# Patient Record
Sex: MALE | Race: WHITE | NOT HISPANIC OR LATINO | URBAN - NONMETROPOLITAN AREA
[De-identification: names, ages, dates, MRNs, and addresses within clinical notes are randomized per-mention and may not be internally consistent; named-entity substitution may affect disease eponyms.]

---

## 2017-07-06 ENCOUNTER — TELEPHONE (OUTPATIENT)
Dept: CARDIOLOGY | Facility: CLINIC | Age: 37
End: 2017-07-06

## 2017-07-06 RX ORDER — PRAVASTATIN SODIUM 20 MG
20 TABLET ORAL DAILY
Qty: 30 TABLET | Refills: 4 | Status: SHIPPED | OUTPATIENT
Start: 2017-07-06 | End: 2017-11-15 | Stop reason: SDUPTHER

## 2017-07-06 NOTE — TELEPHONE ENCOUNTER
Patient called requesting refill on Pravastatin 20mg daily. Refill on Pravastatin 20mg daily sent to pharmacy.

## 2017-11-15 ENCOUNTER — OFFICE VISIT (OUTPATIENT)
Dept: CARDIOLOGY | Facility: CLINIC | Age: 37
End: 2017-11-15

## 2017-11-15 VITALS
HEART RATE: 68 BPM | WEIGHT: 281 LBS | HEIGHT: 76 IN | SYSTOLIC BLOOD PRESSURE: 138 MMHG | DIASTOLIC BLOOD PRESSURE: 82 MMHG | BODY MASS INDEX: 34.22 KG/M2

## 2017-11-15 DIAGNOSIS — I10 ESSENTIAL HYPERTENSION: ICD-10-CM

## 2017-11-15 DIAGNOSIS — R00.2 PALPITATION: Primary | ICD-10-CM

## 2017-11-15 DIAGNOSIS — F41.9 ANXIETY: ICD-10-CM

## 2017-11-15 PROCEDURE — 99213 OFFICE O/P EST LOW 20 MIN: CPT | Performed by: NURSE PRACTITIONER

## 2017-11-15 RX ORDER — PRAVASTATIN SODIUM 20 MG
20 TABLET ORAL DAILY
Qty: 90 TABLET | Refills: 3 | Status: SHIPPED | OUTPATIENT
Start: 2017-11-15 | End: 2018-11-21 | Stop reason: SDUPTHER

## 2017-11-15 NOTE — PROGRESS NOTES
Chief Complaint   Patient presents with   • Follow-up     For cardiac management. Last labs at [facility] 09/2017.   • Shortness of Breath     He states only if walking up stairs.   • Palpitations     Occasional, states about the same.   • Med Refill     Needs refills on Pravastatin-90 day.       Cardiac Complaints  dyspnea and palpitations      Subjective   Bishnu Renee is a 37 y.o. male with a history of tachycardia, hyperlipidemia, and chest pain.  Cardiac workup has been negative for ischemia.  Ecardio showed no arrhythmias.  Corgard was started for palpitations.  He returns today for follow up and states issues with rare palpitations but better than prior. He also continues to shortness of breath with climbing but relates to inactivity, says this has been the same for years.  Labs he reports were done in September at [facility], no copy available for review.  Refills of pravastatin requested.    Cardiac History  Past Surgical History:   Procedure Laterality Date   • CARDIOVASCULAR STRESS TEST  06/17/2013    Stress- ([facility]) 8 Min, 85% THR. /90. Negative.    • CONVERTED (HISTORICAL) HOLTER  01/09/2014    Holter ([facility])- AVG HR 70 BPM.    • ECHO - CONVERTED  01/13/2014    Echo ([facility])- EF 65%    • OTHER SURGICAL HISTORY  01/13/2014    CTA renals ([facility])- Normal    • OTHER SURGICAL HISTORY  03/20/2014    Ecardio- Sinus        Current Outpatient Prescriptions   Medication Sig Dispense Refill   • ALPRAZolam (XANAX) 1 MG tablet Take 1 mg by mouth As Needed for anxiety.     • aspirin 81 MG EC tablet Take 81 mg by mouth Daily.     • nadolol (CORGARD) 40 MG tablet Take 40 mg by mouth 2 (Two) Times a Day.     • PARoxetine (PAXIL) 20 MG tablet Take 20 mg by mouth Every Morning.     • pravastatin (PRAVACHOL) 20 MG tablet Take 1 tablet by mouth Daily. 90 tablet 3   • raNITIdine (ZANTAC) 300 MG tablet Take 300 mg by mouth Every Night.       No current facility-administered medications for this visit.        Review of  "patient's allergies indicates no known allergies.    Past Medical History:   Diagnosis Date   • Anxiety    • H/O hernia repair    • Hypertension    • Palpitations    • SOB (shortness of breath)        Social History     Social History   • Marital status: Single     Spouse name: N/A   • Number of children: N/A   • Years of education: N/A     Occupational History   • Not on file.     Social History Main Topics   • Smoking status: Former Smoker     Quit date: 2012   • Smokeless tobacco: Never Used   • Alcohol use Yes      Comment: rarely   • Drug use: No   • Sexual activity: Not on file     Other Topics Concern   • Not on file     Social History Narrative       Family History   Problem Relation Age of Onset   • Hyperlipidemia Mother    • Asthma Brother        Review of Systems   Constitution: Negative for weakness and malaise/fatigue.   Cardiovascular: Positive for palpitations. Negative for chest pain, dyspnea on exertion and near-syncope.   Respiratory: Positive for shortness of breath. Negative for wheezing.    Musculoskeletal: Negative for arthritis and back pain.   Gastrointestinal: Negative for anorexia, heartburn and nausea.   Genitourinary: Negative for dysuria, hematuria and nocturia.   Neurological: Negative for dizziness, light-headedness and loss of balance.   Psychiatric/Behavioral: Negative for altered mental status and depression.       DiabetesNo  Thyroidnormal    Objective     /82 (BP Location: Left arm)  Pulse 68  Ht 76\" (193 cm)  Wt 281 lb (127 kg)  BMI 34.2 kg/m2    Physical Exam   Constitutional: He is oriented to person, place, and time. He appears well-developed and well-nourished.   HENT:   Head: Normocephalic and atraumatic.   Eyes: EOM are normal. Pupils are equal, round, and reactive to light.   Neck: Normal range of motion. Neck supple.   Cardiovascular: Normal rate and regular rhythm.    Pulmonary/Chest: Effort normal and breath sounds normal.   Abdominal: Soft.   Musculoskeletal: " Normal range of motion.   Neurological: He is alert and oriented to person, place, and time.   Skin: Skin is warm and dry.   Psychiatric: He has a normal mood and affect. His behavior is normal.       Procedures    Assessment/Plan     HR and BP are stable today.  No changes in meds will be made. No new cardiac workup will be advised today as no new concerns are voiced.  Labs are done with health [facility] and patient reports most recent looked okay.  He states his cholesterol was well controlled with the addition of pravastatin.  He was encouraged to get a copy to chart for review. Refills of pravastatin sent per request.  Yearly follow ups will be continued unless problems arise and patient was advised to call for further recommendations.      Problems Addressed this Visit        Cardiovascular and Mediastinum    Palpitation - Primary    HTN (hypertension)       Other    Anxiety                  Electronically signed by CHAD Alvares November 15, 2017 5:14 PM

## 2018-11-21 ENCOUNTER — OFFICE VISIT (OUTPATIENT)
Dept: CARDIOLOGY | Facility: CLINIC | Age: 38
End: 2018-11-21

## 2018-11-21 VITALS
SYSTOLIC BLOOD PRESSURE: 112 MMHG | DIASTOLIC BLOOD PRESSURE: 70 MMHG | WEIGHT: 295 LBS | HEIGHT: 76 IN | HEART RATE: 68 BPM | BODY MASS INDEX: 35.92 KG/M2

## 2018-11-21 DIAGNOSIS — E78.2 MIXED HYPERLIPIDEMIA: ICD-10-CM

## 2018-11-21 DIAGNOSIS — F41.9 ANXIETY: ICD-10-CM

## 2018-11-21 DIAGNOSIS — I10 ESSENTIAL HYPERTENSION: Primary | ICD-10-CM

## 2018-11-21 DIAGNOSIS — R00.2 PALPITATION: ICD-10-CM

## 2018-11-21 PROCEDURE — 99213 OFFICE O/P EST LOW 20 MIN: CPT | Performed by: NURSE PRACTITIONER

## 2018-11-21 RX ORDER — PRAVASTATIN SODIUM 20 MG
20 TABLET ORAL DAILY
Qty: 90 TABLET | Refills: 3 | Status: SHIPPED | OUTPATIENT
Start: 2018-11-21 | End: 2019-11-20 | Stop reason: SDUPTHER

## 2018-11-21 NOTE — PROGRESS NOTES
"Chief Complaint   Patient presents with   • Follow-up     Cardiac management. No recent labs, he has lab order yet has not been able to have done.   • Chest Pain     He states \"will rarely have dull ache to left chest\".   • Med Refill     Needs refills on Pravastatin-90 day.       Subjective       Bishnu Renee is a 38 y.o. male with a history of tachycardia, hyperlipidemia, and chest pain.  Cardiac workup has been negative for ischemia. Ecardio showed no arrhythmias. Corgard was started for palpitations. He has done very well. Today he came for follow up. He feels well. He has an occasional dull ache in his left chest which only lasts a couple of seconds. Denies shortness of breath. No palpitations since being on beta blockers. He has been treated with pravastatin for hyperlipidemia. No recent labs. He has an order from Dr. Lester, but has not completed yet. Refills requested on pravastatin. He is excited about his new baby that was born three months ago.     HPI         Cardiac History:    Past Surgical History:   Procedure Laterality Date   • CARDIOVASCULAR STRESS TEST  06/17/2013    Stress- ([facility]) 8 Min, 85% THR. /90. Negative.    • CONVERTED (HISTORICAL) HOLTER  01/09/2014    Holter ([facility])- AVG HR 70 BPM.    • ECHO - CONVERTED  01/13/2014    Echo ([facility])- EF 65%    • OTHER SURGICAL HISTORY  01/13/2014    CTA renals ([facility])- Normal    • OTHER SURGICAL HISTORY  03/20/2014    Ecardio- Sinus        Current Outpatient Medications   Medication Sig Dispense Refill   • ALPRAZolam (XANAX) 1 MG tablet Take 1 mg by mouth 2 (Two) Times a Day As Needed for Anxiety.     • aspirin 81 MG EC tablet Take 81 mg by mouth Daily.     • nadolol (CORGARD) 40 MG tablet Take 40 mg by mouth 2 (Two) Times a Day.     • PARoxetine (PAXIL) 20 MG tablet Take 20 mg by mouth Every Morning.     • pravastatin (PRAVACHOL) 20 MG tablet Take 1 tablet by mouth Daily. 90 tablet 3   • raNITIdine (ZANTAC) 300 MG tablet Take 300 mg by mouth Every " Night.       No current facility-administered medications for this visit.        Patient has no known allergies.    Past Medical History:   Diagnosis Date   • Anxiety    • H/O hernia repair    • Hypertension    • Palpitations    • SOB (shortness of breath)        Social History     Socioeconomic History   • Marital status: Single     Spouse name: Not on file   • Number of children: Not on file   • Years of education: Not on file   • Highest education level: Not on file   Social Needs   • Financial resource strain: Not on file   • Food insecurity - worry: Not on file   • Food insecurity - inability: Not on file   • Transportation needs - medical: Not on file   • Transportation needs - non-medical: Not on file   Occupational History   • Not on file   Tobacco Use   • Smoking status: Former Smoker     Last attempt to quit:      Years since quittin.8   • Smokeless tobacco: Never Used   Substance and Sexual Activity   • Alcohol use: Yes     Comment: rarely   • Drug use: No   • Sexual activity: Not on file   Other Topics Concern   • Not on file   Social History Narrative   • Not on file       Family History   Problem Relation Age of Onset   • Hyperlipidemia Mother    • Asthma Brother        Review of Systems   Constitution: Negative for decreased appetite, weakness and malaise/fatigue.   HENT: Negative.    Eyes: Negative.    Cardiovascular: Negative for chest pain, dyspnea on exertion, leg swelling, orthopnea, palpitations and syncope.   Respiratory: Negative for cough and shortness of breath.    Endocrine: Negative.    Hematologic/Lymphatic: Negative.    Skin: Negative.    Musculoskeletal: Negative for joint pain and myalgias.   Gastrointestinal: Negative for abdominal pain and melena.   Genitourinary: Negative for hematuria.   Neurological: Negative for dizziness.   Psychiatric/Behavioral: Negative.  Negative for altered mental status and depression.   Allergic/Immunologic: Negative.         Diabetes-  "No  Thyroid-normal    Objective     /70 (BP Location: Right arm)   Pulse 68   Ht 193 cm (76\")   Wt 134 kg (295 lb)   BMI 35.91 kg/m²     Physical Exam   Constitutional: He is oriented to person, place, and time. He appears well-developed and well-nourished. No distress.   HENT:   Head: Normocephalic.   Eyes: Pupils are equal, round, and reactive to light.   Neck: Normal range of motion.   Cardiovascular: Normal rate and intact distal pulses.   Pulmonary/Chest: Effort normal.   Abdominal: Soft. Bowel sounds are normal.   Musculoskeletal: Normal range of motion. He exhibits no edema.   Neurological: He is alert and oriented to person, place, and time.   Skin: Skin is warm and dry. He is not diaphoretic.   Psychiatric: He has a normal mood and affect.   Nursing note and vitals reviewed.    Procedures          Assessment/Plan    Heart rate and blood pressure are stable. Palpitations well controlled with Corgard. Continue the same. He was given a lab order with lipid to be checked as he wasn't sure if added to previous order. Advised to be fasting. Refills sent for pravastatin. His BMI remains elevated. We discussed strategies for weight management including reducing carbohydrates and sugars. He was encouraged to eliminate sodas from his diet and avoid fast food if possible. Regular exercise encouraged. We discussed his symptoms of aching in chest. If symptoms become more frequent or associated with radiation, diaphoresis, nausea, or shortness of breath, recommend repeating stress test. Otherwise, we will see him back in once year.   Bishnu was seen today for follow-up, chest pain and med refill.    Diagnoses and all orders for this visit:    Essential hypertension    Palpitation    Anxiety    Mixed hyperlipidemia    Other orders  -     pravastatin (PRAVACHOL) 20 MG tablet; Take 1 tablet by mouth Daily.        Patient's Body mass index is 35.91 kg/m². BMI is above normal parameters. Recommendations include: " nutrition counseling.                   Electronically signed by CHAD Machado,  November 21, 2018 5:38 PM

## 2019-11-15 NOTE — PROGRESS NOTES
Chief Complaint   Patient presents with   • Follow-up     For cardiac management. Patient is on aspirin. Last lab work was done in August 2019 at the [facility], not in chart. Reports that he has had some chest pain for the last 3 months that feels like a dull throb. Reports that he does get short of breath a lot, with and without exertion. Reports that he occasionally has palpitations.    • Med Refill     Needs refills on cardiac medications. 30 day supplies to [facility].        Cardiac Complaints  chest pressure/discomfort, dyspnea and palpitations      Subjective   Bishnu Renee is a 39 y.o. male with tachycardia, hyperlipidemia, and chest pain.  Cardiac workup has been negative for ischemia. Ecardio showed no arrhythmias. Corgard was started for palpitations, which he has tolerated well.    He comes today for follow up and states he has had more chest pain over the last 3 months that feels like a throbbing pain in the left chest area that he also notices in the center of his chest. He admits not related to exertion and nothing seems to be bring it on.  He states it goes away on its own without intervention.  He has not been to ER or MD in regards. No associated symptoms such as left arm radiation, diaphoresis, N/V reported.  He does also admit to worsening shortness of breath that also comes and goes on its own.  He admits palpitations remain well controlled on current corgard therapy.  Labs he admits are followed by PCP and were checked at the [facility]. No current available but he thinks all were okay.  Refills of cardiac meds requested.        Cardiac History  Past Surgical History:   Procedure Laterality Date   • CARDIOVASCULAR STRESS TEST  06/17/2013    Stress- ([facility]) 8 Min, 85% THR. /90. Negative.    • CONVERTED (HISTORICAL) HOLTER  01/09/2014    Holter ([facility])- AVG HR 70 BPM.    • ECHO - CONVERTED  01/13/2014    Echo ([facility])- EF 65%    • OTHER SURGICAL HISTORY  01/13/2014    CTA renals ([facility])-  Normal    • OTHER SURGICAL HISTORY  2014    Ecardio- Sinus        Current Outpatient Medications   Medication Sig Dispense Refill   • ALPRAZolam (XANAX) 1 MG tablet Take 1 mg by mouth 2 (Two) Times a Day As Needed for Anxiety.     • aspirin 81 MG EC tablet Take 81 mg by mouth Daily.     • nadolol (CORGARD) 40 MG tablet One tablet twice a day 60 tablet 12   • PARoxetine (PAXIL) 20 MG tablet Take 20 mg by mouth Every Morning.     • pravastatin (PRAVACHOL) 20 MG tablet Take 1 tablet by mouth Daily. 90 tablet 3   • raNITIdine (ZANTAC) 300 MG tablet Take 300 mg by mouth Every Night.       No current facility-administered medications for this visit.        Patient has no known allergies.    Past Medical History:   Diagnosis Date   • Anxiety    • H/O hernia repair    • Hypertension    • Palpitations    • SOB (shortness of breath)        Social History     Socioeconomic History   • Marital status: Single     Spouse name: Not on file   • Number of children: Not on file   • Years of education: Not on file   • Highest education level: Not on file   Tobacco Use   • Smoking status: Former Smoker     Last attempt to quit: 2012     Years since quittin.8   • Smokeless tobacco: Never Used   Substance and Sexual Activity   • Alcohol use: Yes     Comment: rarely   • Drug use: No       Family History   Problem Relation Age of Onset   • Hyperlipidemia Mother    • Asthma Brother        Review of Systems   Constitution: Negative for weakness and malaise/fatigue.   Cardiovascular: Positive for chest pain, dyspnea on exertion and palpitations. Negative for claudication, irregular heartbeat, leg swelling, near-syncope and syncope.   Respiratory: Positive for shortness of breath. Negative for cough and wheezing.    Musculoskeletal: Negative for back pain, joint pain and stiffness.   Gastrointestinal: Negative for anorexia, heartburn, nausea and vomiting.   Genitourinary: Negative for dysuria, hematuria, hesitancy and nocturia.  "  Neurological: Negative for dizziness, light-headedness and loss of balance.   Psychiatric/Behavioral: Negative for depression and memory loss. The patient is nervous/anxious.            Objective     /92 (BP Location: Left arm)   Pulse 64   Ht 193 cm (75.98\")   Wt 135 kg (298 lb)   BMI 36.29 kg/m²     Physical Exam   Constitutional: He is oriented to person, place, and time. He appears well-developed and well-nourished.   HENT:   Head: Normocephalic and atraumatic.   Eyes: EOM are normal. Pupils are equal, round, and reactive to light.   Neck: Normal range of motion. Neck supple.   Cardiovascular: Normal rate and regular rhythm.   Murmur heard.  Pulmonary/Chest: Effort normal and breath sounds normal.   Abdominal: Soft.   Musculoskeletal: Normal range of motion.   Neurological: He is alert and oriented to person, place, and time.   Skin: Skin is warm and dry.   Psychiatric: His mood appears anxious.       Procedures    Assessment/Plan     Atypical chest pain:  Patient will be urged to repeat treadmill stress testing and echo to assess for any EKG changes, arrythmia, ST changes for any ischemia, LV function, and valvular areas. More recommendations to follow.  Discussed addition of NTG to meds for him to use as needed but he declines. Given the atypical nature of chest pain, he was urged to continue zantac as this may be GI related.  If workup negative, he was urged to consider GI referral.     Palpitations: Well managed with current corgard therapy.  Same dosing continued.  Limited caffeine intake encouraged.     Shortness of breath:  Seem to be worse as well according to patient, stress and echo advised to rule out cardiac concerns.     Hyperlipidemia:  Managed with pravachol therapy.  FLP he reports done recently at [facility]. No current available for review.  Could we have a copy for our records?  For now, current dosing continued. Refills sent.    BMI noted at 36.29, he was urged to limit caffeine, " carbs, and caloric intake. Patient advised to develop a walking regimen for 20-30 minutes 3xweekly.    FU will be advised for 6 months this visit or sooner if needed.                Problems Addressed this Visit        Cardiovascular and Mediastinum    Palpitation    Relevant Orders    Treadmill Stress Test    Adult Transthoracic Echo Complete W/ Cont if Necessary Per Protocol    HTN (hypertension) - Primary    Relevant Medications    nadolol (CORGARD) 40 MG tablet    Other Relevant Orders    Treadmill Stress Test    Adult Transthoracic Echo Complete W/ Cont if Necessary Per Protocol    Mixed hyperlipidemia    Relevant Medications    pravastatin (PRAVACHOL) 20 MG tablet       Other    Anxiety      Other Visit Diagnoses     Atypical angina (CMS/HCC)        Relevant Medications    nadolol (CORGARD) 40 MG tablet    Other Relevant Orders    Treadmill Stress Test    Adult Transthoracic Echo Complete W/ Cont if Necessary Per Protocol    Shortness of breath        Relevant Orders    Treadmill Stress Test    Adult Transthoracic Echo Complete W/ Cont if Necessary Per Protocol    Severe obesity (BMI 35.0-39.9) with comorbidity (CMS/HCC)              Patient's Body mass index is 36.29 kg/m². BMI is above normal parameters. Recommendations include: nutrition counseling.              Electronically signed by CHAD Alvares November 20, 2019 4:58 PM

## 2019-11-20 ENCOUNTER — OFFICE VISIT (OUTPATIENT)
Dept: CARDIOLOGY | Facility: CLINIC | Age: 39
End: 2019-11-20

## 2019-11-20 VITALS
DIASTOLIC BLOOD PRESSURE: 92 MMHG | BODY MASS INDEX: 36.29 KG/M2 | HEART RATE: 64 BPM | HEIGHT: 76 IN | SYSTOLIC BLOOD PRESSURE: 130 MMHG | WEIGHT: 298 LBS

## 2019-11-20 DIAGNOSIS — I10 ESSENTIAL HYPERTENSION: Primary | ICD-10-CM

## 2019-11-20 DIAGNOSIS — E78.2 MIXED HYPERLIPIDEMIA: ICD-10-CM

## 2019-11-20 DIAGNOSIS — R00.2 PALPITATION: ICD-10-CM

## 2019-11-20 DIAGNOSIS — I20.8 ATYPICAL ANGINA (HCC): ICD-10-CM

## 2019-11-20 DIAGNOSIS — R06.02 SHORTNESS OF BREATH: ICD-10-CM

## 2019-11-20 DIAGNOSIS — E66.01 SEVERE OBESITY (BMI 35.0-39.9) WITH COMORBIDITY (HCC): ICD-10-CM

## 2019-11-20 DIAGNOSIS — F41.9 ANXIETY: ICD-10-CM

## 2019-11-20 PROCEDURE — 99214 OFFICE O/P EST MOD 30 MIN: CPT | Performed by: NURSE PRACTITIONER

## 2019-11-20 RX ORDER — PRAVASTATIN SODIUM 20 MG
20 TABLET ORAL DAILY
Qty: 90 TABLET | Refills: 3 | Status: SHIPPED | OUTPATIENT
Start: 2019-11-20 | End: 2020-11-18 | Stop reason: SDUPTHER

## 2019-11-20 RX ORDER — NADOLOL 40 MG/1
TABLET ORAL
Qty: 60 TABLET | Refills: 12 | Status: SHIPPED | OUTPATIENT
Start: 2019-11-20 | End: 2020-11-18 | Stop reason: SDUPTHER

## 2019-11-27 ENCOUNTER — HOSPITAL ENCOUNTER (OUTPATIENT)
Dept: CARDIOLOGY | Facility: HOSPITAL | Age: 39
Discharge: HOME | End: 2019-11-27

## 2019-11-27 ENCOUNTER — HOSPITAL ENCOUNTER (OUTPATIENT)
Dept: CARDIOLOGY | Facility: HOSPITAL | Age: 39
Discharge: HOME | End: 2019-11-27
Admitting: NURSE PRACTITIONER

## 2019-11-27 VITALS — HEIGHT: 76 IN | BODY MASS INDEX: 36.24 KG/M2 | WEIGHT: 297.62 LBS

## 2019-11-27 DIAGNOSIS — I20.8 ATYPICAL ANGINA (HCC): ICD-10-CM

## 2019-11-27 DIAGNOSIS — R00.2 PALPITATION: ICD-10-CM

## 2019-11-27 DIAGNOSIS — R06.02 SHORTNESS OF BREATH: ICD-10-CM

## 2019-11-27 DIAGNOSIS — I10 ESSENTIAL HYPERTENSION: ICD-10-CM

## 2019-11-27 LAB
AORTIC DIMENSIONLESS INDEX: 1 (DI)
BH CV ECHO MEAS - ACS: 1.7 CM
BH CV ECHO MEAS - AO MAX PG (FULL): -0.63 MMHG
BH CV ECHO MEAS - AO MAX PG: 3.2 MMHG
BH CV ECHO MEAS - AO MEAN PG (FULL): -1 MMHG
BH CV ECHO MEAS - AO MEAN PG: 1 MMHG
BH CV ECHO MEAS - AO ROOT AREA (BSA CORRECTED): 1.2
BH CV ECHO MEAS - AO ROOT AREA: 8 CM^2
BH CV ECHO MEAS - AO ROOT DIAM: 3.2 CM
BH CV ECHO MEAS - AO V2 MAX: 88.9 CM/SEC
BH CV ECHO MEAS - AO V2 MEAN: 55.2 CM/SEC
BH CV ECHO MEAS - AO V2 VTI: 15.7 CM
BH CV ECHO MEAS - BSA(HAYCOCK): 2.7 M^2
BH CV ECHO MEAS - BSA: 2.6 M^2
BH CV ECHO MEAS - BZI_BMI: 36.3 KILOGRAMS/M^2
BH CV ECHO MEAS - BZI_METRIC_HEIGHT: 193 CM
BH CV ECHO MEAS - BZI_METRIC_WEIGHT: 135.2 KG
BH CV ECHO MEAS - EDV(CUBED): 155.7 ML
BH CV ECHO MEAS - EDV(TEICH): 140.1 ML
BH CV ECHO MEAS - EF(CUBED): 78.2 %
BH CV ECHO MEAS - EF(TEICH): 69.9 %
BH CV ECHO MEAS - ESV(CUBED): 34 ML
BH CV ECHO MEAS - ESV(TEICH): 42.2 ML
BH CV ECHO MEAS - FS: 39.8 %
BH CV ECHO MEAS - IVS/LVPW: 0.99
BH CV ECHO MEAS - IVSD: 1.1 CM
BH CV ECHO MEAS - LA DIMENSION: 4.1 CM
BH CV ECHO MEAS - LA/AO: 1.3
BH CV ECHO MEAS - LV IVRT: 0.12 SEC
BH CV ECHO MEAS - LV MASS(C)D: 240.6 GRAMS
BH CV ECHO MEAS - LV MASS(C)DI: 91.7 GRAMS/M^2
BH CV ECHO MEAS - LV MAX PG: 3.8 MMHG
BH CV ECHO MEAS - LV MEAN PG: 2 MMHG
BH CV ECHO MEAS - LV V1 MAX: 97.3 CM/SEC
BH CV ECHO MEAS - LV V1 MEAN: 65.4 CM/SEC
BH CV ECHO MEAS - LV V1 VTI: 23.2 CM
BH CV ECHO MEAS - LVIDD: 5.4 CM
BH CV ECHO MEAS - LVIDS: 3.2 CM
BH CV ECHO MEAS - LVPWD: 1.1 CM
BH CV ECHO MEAS - MV A MAX VEL: 37.2 CM/SEC
BH CV ECHO MEAS - MV DEC SLOPE: 289 CM/SEC^2
BH CV ECHO MEAS - MV DEC TIME: 0.15 SEC
BH CV ECHO MEAS - MV E MAX VEL: 59.2 CM/SEC
BH CV ECHO MEAS - MV E/A: 1.6
BH CV ECHO MEAS - MV MAX PG: 1.7 MMHG
BH CV ECHO MEAS - MV MEAN PG: 1 MMHG
BH CV ECHO MEAS - MV P1/2T MAX VEL: 63.8 CM/SEC
BH CV ECHO MEAS - MV P1/2T: 64.7 MSEC
BH CV ECHO MEAS - MV V2 MAX: 64.6 CM/SEC
BH CV ECHO MEAS - MV V2 MEAN: 41.2 CM/SEC
BH CV ECHO MEAS - MV V2 VTI: 18.1 CM
BH CV ECHO MEAS - MVA P1/2T LCG: 3.4 CM^2
BH CV ECHO MEAS - MVA(P1/2T): 3.4 CM^2
BH CV ECHO MEAS - PA MAX PG (FULL): 3.6 MMHG
BH CV ECHO MEAS - PA MAX PG: 5.1 MMHG
BH CV ECHO MEAS - PA MEAN PG (FULL): 2 MMHG
BH CV ECHO MEAS - PA MEAN PG: 3 MMHG
BH CV ECHO MEAS - PA V2 MAX: 113 CM/SEC
BH CV ECHO MEAS - PA V2 MEAN: 76.3 CM/SEC
BH CV ECHO MEAS - PA V2 VTI: 24.3 CM
BH CV ECHO MEAS - PI END-D VEL: 93.7 CM/SEC
BH CV ECHO MEAS - RAP SYSTOLE: 10 MMHG
BH CV ECHO MEAS - RV MAX PG: 1.5 MMHG
BH CV ECHO MEAS - RV MEAN PG: 1 MMHG
BH CV ECHO MEAS - RV V1 MAX: 61.4 CM/SEC
BH CV ECHO MEAS - RV V1 MEAN: 41.5 CM/SEC
BH CV ECHO MEAS - RV V1 VTI: 14.2 CM
BH CV ECHO MEAS - RVDD: 2.9 CM
BH CV ECHO MEAS - RVSP: 13 MMHG
BH CV ECHO MEAS - SI(AO): 48.1 ML/M^2
BH CV ECHO MEAS - SI(CUBED): 46.4 ML/M^2
BH CV ECHO MEAS - SI(TEICH): 37.3 ML/M^2
BH CV ECHO MEAS - SV(AO): 126.3 ML
BH CV ECHO MEAS - SV(CUBED): 121.7 ML
BH CV ECHO MEAS - SV(TEICH): 97.9 ML
BH CV ECHO MEAS - TR MAX VEL: 85 CM/SEC
BH CV STRESS RECOVERY BP: NORMAL MMHG
BH CV STRESS RECOVERY HR: 85 BPM
MAXIMAL PREDICTED HEART RATE: 182 BPM
MAXIMAL PREDICTED HEART RATE: 182 BPM
PERCENT MAX PREDICTED HR: 76.92 %
STRESS BASELINE BP: NORMAL MMHG
STRESS BASELINE HR: 66 BPM
STRESS PERCENT HR: 90 %
STRESS POST ESTIMATED WORKLOAD: 10.1 METS
STRESS POST EXERCISE DUR MIN: 7 MIN
STRESS POST EXERCISE DUR SEC: 15 SEC
STRESS POST PEAK BP: NORMAL MMHG
STRESS POST PEAK HR: 140 BPM
STRESS TARGET HR: 155 BPM
STRESS TARGET HR: 155 BPM

## 2019-11-27 PROCEDURE — 93017 CV STRESS TEST TRACING ONLY: CPT

## 2019-11-27 PROCEDURE — 93306 TTE W/DOPPLER COMPLETE: CPT

## 2019-11-27 PROCEDURE — 93018 CV STRESS TEST I&R ONLY: CPT | Performed by: INTERNAL MEDICINE

## 2019-11-27 PROCEDURE — 93306 TTE W/DOPPLER COMPLETE: CPT | Performed by: INTERNAL MEDICINE

## 2019-11-27 PROCEDURE — 93016 CV STRESS TEST SUPVJ ONLY: CPT | Performed by: NURSE PRACTITIONER

## 2019-11-29 NOTE — PROGRESS NOTES
Patient did not achieve target HR.  If continue to be symptomatic, please advise he needs to have nuclear stress.  No ST changes on EKG,

## 2019-12-02 ENCOUNTER — TELEPHONE (OUTPATIENT)
Dept: CARDIOLOGY | Facility: CLINIC | Age: 39
End: 2019-12-02

## 2019-12-02 DIAGNOSIS — I20.8 ANGINA AT REST (HCC): Primary | ICD-10-CM

## 2019-12-02 DIAGNOSIS — I20.8 ANGINAL EQUIVALENT (HCC): ICD-10-CM

## 2019-12-02 DIAGNOSIS — R07.89 CHEST PAIN, ATYPICAL: ICD-10-CM

## 2019-12-02 NOTE — TELEPHONE ENCOUNTER
----- Message from Samantha Echeverria LPN sent at 12/2/2019  5:23 PM EST -----  Patient was made aware that he did not achieve target HR so it was inconclusive for stress-induced ischemia. Patient states that he has still been having chest pain and shortness of breath, patient is agreeable to have a nuclear stress.

## 2019-12-10 ENCOUNTER — HOSPITAL ENCOUNTER (OUTPATIENT)
Dept: CARDIOLOGY | Facility: HOSPITAL | Age: 39
Discharge: HOME | End: 2019-12-10

## 2019-12-10 DIAGNOSIS — R07.89 CHEST PAIN, ATYPICAL: ICD-10-CM

## 2019-12-10 DIAGNOSIS — I20.8 ANGINA AT REST (HCC): ICD-10-CM

## 2019-12-10 DIAGNOSIS — I20.8 ANGINAL EQUIVALENT (HCC): ICD-10-CM

## 2019-12-10 LAB
BH CV NUCLEAR PRIOR STUDY: 3
BH CV STRESS BP STAGE 1: NORMAL
BH CV STRESS COMMENTS STAGE 1: NORMAL
BH CV STRESS DOSE REGADENOSON STAGE 1: 0.4
BH CV STRESS DURATION MIN STAGE 1: 0
BH CV STRESS DURATION SEC STAGE 1: 10
BH CV STRESS HR STAGE 1: 82
BH CV STRESS PROTOCOL 1: NORMAL
BH CV STRESS RECOVERY BP: NORMAL MMHG
BH CV STRESS RECOVERY HR: 92 BPM
BH CV STRESS STAGE 1: 1
LV EF NUC BP: 56 %
MAXIMAL PREDICTED HEART RATE: 182 BPM
PERCENT MAX PREDICTED HR: 54.95 %
STRESS BASELINE BP: NORMAL MMHG
STRESS BASELINE HR: 71 BPM
STRESS PERCENT HR: 65 %
STRESS POST PEAK BP: NORMAL MMHG
STRESS POST PEAK HR: 100 BPM
STRESS TARGET HR: 155 BPM

## 2019-12-10 PROCEDURE — 93018 CV STRESS TEST I&R ONLY: CPT | Performed by: INTERNAL MEDICINE

## 2019-12-10 PROCEDURE — 25010000002 REGADENOSON 0.4 MG/5ML SOLUTION: Performed by: INTERNAL MEDICINE

## 2019-12-10 PROCEDURE — 78452 HT MUSCLE IMAGE SPECT MULT: CPT

## 2019-12-10 PROCEDURE — 93016 CV STRESS TEST SUPVJ ONLY: CPT | Performed by: NURSE PRACTITIONER

## 2019-12-10 PROCEDURE — 78452 HT MUSCLE IMAGE SPECT MULT: CPT | Performed by: INTERNAL MEDICINE

## 2019-12-10 PROCEDURE — A9500 TC99M SESTAMIBI: HCPCS | Performed by: INTERNAL MEDICINE

## 2019-12-10 PROCEDURE — 0 TECHNETIUM SESTAMIBI: Performed by: INTERNAL MEDICINE

## 2019-12-10 PROCEDURE — 93017 CV STRESS TEST TRACING ONLY: CPT

## 2019-12-10 RX ADMIN — TECHNETIUM TC 99M SESTAMIBI 1 DOSE: 1 INJECTION INTRAVENOUS at 08:56

## 2019-12-10 RX ADMIN — TECHNETIUM TC 99M SESTAMIBI 1 DOSE: 1 INJECTION INTRAVENOUS at 08:57

## 2019-12-10 RX ADMIN — REGADENOSON 0.4 MG: 0.08 INJECTION, SOLUTION INTRAVENOUS at 11:44

## 2019-12-11 ENCOUNTER — TELEPHONE (OUTPATIENT)
Dept: CARDIOLOGY | Facility: CLINIC | Age: 39
End: 2019-12-11

## 2019-12-11 RX ORDER — ISOSORBIDE MONONITRATE 30 MG/1
30 TABLET, EXTENDED RELEASE ORAL EVERY MORNING
Qty: 30 TABLET | Refills: 11 | Status: SHIPPED | OUTPATIENT
Start: 2019-12-11 | End: 2020-11-18 | Stop reason: SDUPTHER

## 2019-12-11 NOTE — TELEPHONE ENCOUNTER
Patient was made aware of results of stress test. Possible lateral wall ischemia. He was made aware of adding Imdur 30 mg daily. He was made aware that if symptoms persist to call back to schedule cath. Patient verbalized understanding.

## 2019-12-11 NOTE — TELEPHONE ENCOUNTER
----- Message from CHAD Waggoner sent at 12/11/2019  4:17 PM EST -----  ? Lateral wall ischemia.  Add imdur at 30mg If symptoms persist, cath advised.

## 2020-11-18 ENCOUNTER — OFFICE VISIT (OUTPATIENT)
Dept: CARDIOLOGY | Facility: CLINIC | Age: 40
End: 2020-11-18

## 2020-11-18 VITALS
HEART RATE: 68 BPM | DIASTOLIC BLOOD PRESSURE: 84 MMHG | SYSTOLIC BLOOD PRESSURE: 120 MMHG | WEIGHT: 298 LBS | HEIGHT: 76 IN | BODY MASS INDEX: 36.29 KG/M2 | TEMPERATURE: 98.2 F

## 2020-11-18 DIAGNOSIS — E66.01 SEVERE OBESITY (BMI 35.0-39.9) WITH COMORBIDITY (HCC): ICD-10-CM

## 2020-11-18 DIAGNOSIS — R00.2 PALPITATION: ICD-10-CM

## 2020-11-18 DIAGNOSIS — I10 ESSENTIAL HYPERTENSION: Primary | ICD-10-CM

## 2020-11-18 DIAGNOSIS — E78.2 MIXED HYPERLIPIDEMIA: ICD-10-CM

## 2020-11-18 DIAGNOSIS — R94.39 ABNORMAL STRESS TEST: ICD-10-CM

## 2020-11-18 DIAGNOSIS — I20.8 ANGINA OF EFFORT (HCC): ICD-10-CM

## 2020-11-18 PROCEDURE — 99214 OFFICE O/P EST MOD 30 MIN: CPT | Performed by: NURSE PRACTITIONER

## 2020-11-18 RX ORDER — PRAVASTATIN SODIUM 20 MG
20 TABLET ORAL DAILY
Qty: 90 TABLET | Refills: 3 | Status: SHIPPED | OUTPATIENT
Start: 2020-11-18 | End: 2021-11-22

## 2020-11-18 RX ORDER — NADOLOL 40 MG/1
TABLET ORAL
Qty: 180 TABLET | Refills: 2 | Status: SHIPPED | OUTPATIENT
Start: 2020-11-18 | End: 2021-11-22

## 2020-11-18 RX ORDER — ISOSORBIDE MONONITRATE 30 MG/1
30 TABLET, EXTENDED RELEASE ORAL EVERY MORNING
Qty: 90 TABLET | Refills: 2 | Status: SHIPPED | OUTPATIENT
Start: 2020-11-18 | End: 2021-01-20 | Stop reason: ALTCHOICE

## 2020-11-18 NOTE — PROGRESS NOTES
Chief Complaint   Patient presents with   • Follow-up     For cardiac management. Patient is on aspirin. Last lab work was done on 05/06/19 per PCP, in chart under labs. States that he has had a dull, squeezing chest pain that can go away for weeks and then happen one day and keep happening and then go away again. States that he occasionally has palpitations.    • Med Refill     Needs refills on cardiac medications. 90 day supplies to [facility] [place]. Brought medication list with visit.        Cardiac Complaints  chest pressure/discomfort      Subjective   Bishnu Renee is a 40 y.o. male with tachycardia, hyperlipidemia, abnormal stress test, and chest pain.  Cardiac workup has been negative for ischemia. Ecardio showed no arrhythmias. Corgard was started for palpitations, which he has tolerated well.  At last visit in November 2019 atypical chest pain reported. Cardiac workup with stress and echo advised. Stress showed possible small, lateral wall ischemia and imdur was added.     Patient comes today for follow up and reports continued chest pain. Patient continues to have squeezing pain that feels like a tightness in his chest that goes into the back. He states it will come for days at a time and then just disappears for many weeks. Palpitations are present but no worse than prior, only rare in nature. Labs have not been done for a year, order requested. Refills requested for 90 day supply.        Cardiac History  Past Surgical History:   Procedure Laterality Date   • CARDIOVASCULAR STRESS TEST  06/17/2013    Stress- ([facility]) 8 Min, 85% THR. /90. Negative.    • CARDIOVASCULAR STRESS TEST  11/27/2019    R.Stress- 7 Min, 15 Secs. 10.1 METS. 77% THR. BP- 171/64. Inconclusive test.   • CARDIOVASCULAR STRESS TEST  12/10/2019    L. Cardiolite- EF 56%. R/O Small Lateral Ischemia.   • CONVERTED (HISTORICAL) HOLTER  01/09/2014    Holter ([facility])- AVG HR 70 BPM.    • ECHO - CONVERTED  01/13/2014    Echo ([facility])-  EF 65%    • ECHO - CONVERTED  2019    TLS. EF 65%. Mild MR. LA- 4.1 Cm. RVSP- 13v mmHg.   • OTHER SURGICAL HISTORY  2014    CTA renals ([facility])- Normal    • OTHER SURGICAL HISTORY  2014    Ecardio- Sinus        Current Outpatient Medications   Medication Sig Dispense Refill   • ALPRAZolam (XANAX) 1 MG tablet Take 1 mg by mouth 2 (Two) Times a Day As Needed for Anxiety.     • aspirin 81 MG EC tablet Take 81 mg by mouth Daily.     • isosorbide mononitrate (IMDUR) 30 MG 24 hr tablet Take 1 tablet by mouth Every Morning. 90 tablet 2   • nadolol (CORGARD) 40 MG tablet One tablet twice a day 180 tablet 2   • pravastatin (Pravachol) 20 MG tablet Take 1 tablet by mouth Daily. 90 tablet 3   • raNITIdine (ZANTAC) 300 MG tablet Take 300 mg by mouth Every Night.       No current facility-administered medications for this visit.        Patient has no known allergies.    Past Medical History:   Diagnosis Date   • Anxiety    • H/O hernia repair    • Hypertension    • Palpitations    • SOB (shortness of breath)        Social History     Socioeconomic History   • Marital status:      Spouse name: Not on file   • Number of children: Not on file   • Years of education: Not on file   • Highest education level: Not on file   Tobacco Use   • Smoking status: Former Smoker     Quit date:      Years since quittin.8   • Smokeless tobacco: Never Used   Substance and Sexual Activity   • Alcohol use: Not Currently     Comment: rarely   • Drug use: No       Family History   Problem Relation Age of Onset   • Hyperlipidemia Mother    • Asthma Brother        Review of Systems   Constitution: Negative for malaise/fatigue and night sweats.   Cardiovascular: Positive for chest pain. Negative for claudication, dyspnea on exertion, irregular heartbeat, leg swelling, near-syncope, orthopnea, palpitations and syncope.   Respiratory: Negative for cough, shortness of breath and wheezing.    Musculoskeletal: Positive for  "stiffness. Negative for back pain and joint pain.   Gastrointestinal: Negative for anorexia, heartburn, melena, nausea and vomiting.   Genitourinary: Negative for dysuria, hematuria, hesitancy and nocturia.   Neurological: Negative for dizziness, light-headedness and loss of balance.   Psychiatric/Behavioral: Negative for depression and memory loss. The patient is not nervous/anxious.            Objective     /84 (BP Location: Left arm)   Pulse 68   Temp 98.2 °F (36.8 °C)   Ht 193 cm (75.98\")   Wt 135 kg (298 lb)   BMI 36.29 kg/m²     Constitutional:       Appearance: Healthy appearance. Not in distress.   Eyes:      Pupils: Pupils are equal, round, and reactive to light.   HENT:      Nose: Nose normal.   Neck:      Musculoskeletal: Neck supple.   Pulmonary:      Effort: Pulmonary effort is normal.      Breath sounds: Normal breath sounds.   Cardiovascular:      PMI at left midclavicular line. Normal rate. Regular rhythm.      Murmurs: There is a systolic murmur.   Abdominal:      Palpations: Abdomen is soft.   Musculoskeletal: Normal range of motion.   Skin:     General: Skin is warm and dry.   Neurological:      Mental Status: Oriented to person, place and time.         Procedures    Assessment/Plan     Abnormal stress test:  Cardiac stress showed small lateral ischemia. Imdur was added at 30mg daily.  ASA therapy continued at current. Chest pain continues at random. Imdur to be continued. Cardiac cath recommended to assess for any stenosis causing concerns.     HTN:  Blood pressure stable. No changes to current corgard/imdur therapy recommended. Limited sodium advised.    Palpitations:  On corgard therapy.  Patient tolerates well. No worsening palpitations noted.     Hyperlipidemia:  On pravachol therapy.  No FLP has been done for sometime. Order will be provided with more recommendations to follow.    BMI noted at 36.29, good cardiac diet with limited carbs, calories, and activity as tolerated " recommended.    Refills per request    3 month follow up recommended.                 Problems Addressed this Visit        Cardiovascular and Mediastinum    Palpitation    HTN (hypertension) - Primary    Relevant Medications    nadolol (CORGARD) 40 MG tablet    Other Relevant Orders    CBC (No Diff)    Comprehensive Metabolic Panel    Lipid Panel    TSH    Mixed hyperlipidemia    Relevant Medications    pravastatin (Pravachol) 20 MG tablet    Other Relevant Orders    CBC (No Diff)    Comprehensive Metabolic Panel    Lipid Panel    TSH      Other Visit Diagnoses     Abnormal stress test        Relevant Orders    CBC (No Diff)    Comprehensive Metabolic Panel    Lipid Panel    TSH    [facility]    Angina of effort (CMS/HCC)        Relevant Medications    isosorbide mononitrate (IMDUR) 30 MG 24 hr tablet    nadolol (CORGARD) 40 MG tablet    Other Relevant Orders    [facility]    Severe obesity (BMI 35.0-39.9) with comorbidity (CMS/[facility])          Diagnoses       Codes Comments    Essential hypertension    -  Primary ICD-10-CM: I10  ICD-9-CM: 401.9     Abnormal stress test     ICD-10-CM: R94.39  ICD-9-CM: 794.39     Angina of effort (CMS/HCC)     ICD-10-CM: I20.8  ICD-9-CM: 413.9     Mixed hyperlipidemia     ICD-10-CM: E78.2  ICD-9-CM: 272.2     Palpitation     ICD-10-CM: R00.2  ICD-9-CM: 785.1     Severe obesity (BMI 35.0-39.9) with comorbidity (CMS/HCC)     ICD-10-CM: E66.01  ICD-9-CM: 278.01           Patient's Body mass index is 36.29 kg/m². BMI is above normal parameters. Recommendations include: nutrition counseling.              Electronically signed by Kelly Roque, CHAD November 18, 2020 11:17 EST

## 2020-12-01 ENCOUNTER — OUTSIDE FACILITY SERVICE (OUTPATIENT)
Dept: CARDIOLOGY | Facility: CLINIC | Age: 40
End: 2020-12-01

## 2020-12-01 PROCEDURE — 93458 L HRT ARTERY/VENTRICLE ANGIO: CPT | Performed by: INTERNAL MEDICINE

## 2021-01-20 ENCOUNTER — OFFICE VISIT (OUTPATIENT)
Dept: CARDIOLOGY | Facility: CLINIC | Age: 41
End: 2021-01-20

## 2021-01-20 VITALS
HEART RATE: 76 BPM | BODY MASS INDEX: 37.02 KG/M2 | DIASTOLIC BLOOD PRESSURE: 80 MMHG | HEIGHT: 76 IN | TEMPERATURE: 98.6 F | WEIGHT: 304 LBS | SYSTOLIC BLOOD PRESSURE: 128 MMHG

## 2021-01-20 DIAGNOSIS — I10 ESSENTIAL HYPERTENSION: ICD-10-CM

## 2021-01-20 DIAGNOSIS — E78.2 MIXED HYPERLIPIDEMIA: ICD-10-CM

## 2021-01-20 DIAGNOSIS — F41.9 ANXIETY: ICD-10-CM

## 2021-01-20 DIAGNOSIS — I25.10 ENDOTHELIAL DYSFUNCTION OF CORONARY ARTERY: ICD-10-CM

## 2021-01-20 DIAGNOSIS — E66.01 MORBIDLY OBESE (HCC): ICD-10-CM

## 2021-01-20 DIAGNOSIS — Q24.5 CORONARY-MYOCARDIAL BRIDGE: Primary | ICD-10-CM

## 2021-01-20 PROCEDURE — 99214 OFFICE O/P EST MOD 30 MIN: CPT | Performed by: NURSE PRACTITIONER

## 2021-01-20 NOTE — PROGRESS NOTES
Chief Complaint   Patient presents with   • Hospital Follow Up Visit     Patient had cardiac catheterization on 12/01/2020 without stenting to be managed medically. Last lab work in chart under labs from 11/30/2020 per Kelly.    • Aspirin     Patient is on aspirin.    • Med Refill     Needs refills on l-arginine. 90 day supplies to [facility] in [place]. Brought medication list with visit.    • Chest Pain     States that he has still had some chest pain since cath, but it has not been as frequent as before.    • Shortness of Breath     States that he does have shortness of breath occasionally.        Subjective       Bishnu Renee is a 40 y.o. male  with tachycardia, hyperlipidemia, abnormal stress test, and chest pain.  Cardiac workup has been negative for ischemia. Ecardio showed no arrhythmias. Corgard was started for palpitations, which he has tolerated well.  At last visit in November 2019 atypical chest pain reported. Cardiac workup with stress and echo advised. Stress showed possible small, lateral wall ischemia and imdur was added.     He continued to have symptoms and underwent cardiac catheterization in December 2020.  Myocardial bridging and possible endothelial dysfunction was noted with recommendation to manage medically.    Today he comes to the office for a hospital follow up visit. Overall his symptoms have improved.     Cardiac History:    Past Surgical History:   Procedure Laterality Date   • CARDIAC CATHETERIZATION  12/01/2020    Mypocardial Bridging of LAD   • CARDIOVASCULAR STRESS TEST  06/17/2013    Stress- ([facility]) 8 Min, 85% THR. /90. Negative.    • CARDIOVASCULAR STRESS TEST  11/27/2019    R.Stress- 7 Min, 15 Secs. 10.1 METS. 77% THR. BP- 171/64. Inconclusive test.   • CARDIOVASCULAR STRESS TEST  12/10/2019    L. Cardiolite- EF 56%. R/O Small Lateral Ischemia.   • CONVERTED (HISTORICAL) HOLTER  01/09/2014    Holter ([facility])- AVG HR 70 BPM.    • ECHO - CONVERTED  01/13/2014     Echo ([facility])- EF 65%    • ECHO - CONVERTED  2019    TLS. EF 65%. Mild MR. LA- 4.1 Cm. RVSP- 13v mmHg.   • OTHER SURGICAL HISTORY  2014    CTA renals ([facility])- Normal    • OTHER SURGICAL HISTORY  2014    Ecardio- Sinus        Current Outpatient Medications   Medication Sig Dispense Refill   • ALPRAZolam (XANAX) 1 MG tablet Take 1 mg by mouth 2 (Two) Times a Day As Needed for Anxiety.     • Arginine 1000 MG tablet Take 1,000 mg by mouth 2 (two) times a day. 60 each 8   • aspirin 81 MG EC tablet Take 81 mg by mouth Daily.     • nadolol (CORGARD) 40 MG tablet One tablet twice a day 180 tablet 2   • pravastatin (Pravachol) 20 MG tablet Take 1 tablet by mouth Daily. 90 tablet 3     No current facility-administered medications for this visit.        Patient has no known allergies.    Past Medical History:   Diagnosis Date   • Anxiety    • H/O hernia repair    • Hypertension    • Palpitations    • SOB (shortness of breath)        Social History     Socioeconomic History   • Marital status:      Spouse name: Not on file   • Number of children: Not on file   • Years of education: Not on file   • Highest education level: Not on file   Tobacco Use   • Smoking status: Former Smoker     Quit date:      Years since quittin.0   • Smokeless tobacco: Never Used   Substance and Sexual Activity   • Alcohol use: Not Currently     Comment: rarely   • Drug use: No       Family History   Problem Relation Age of Onset   • Hyperlipidemia Mother    • Asthma Brother        Review of Systems   Constitution: Negative for decreased appetite, diaphoresis and malaise/fatigue.   HENT: Negative for nosebleeds.    Eyes: Negative for blurred vision.   Cardiovascular: Positive for chest pain (occassional mild pain but better since cath). Negative for claudication, cyanosis, dyspnea on exertion, irregular heartbeat, leg swelling, near-syncope, orthopnea, palpitations, paroxysmal nocturnal dyspnea and syncope.   Respiratory:  "Positive for shortness of breath (with activity, no worse).    Endocrine: Negative for cold intolerance and heat intolerance.   Hematologic/Lymphatic: Does not bruise/bleed easily.   Skin: Negative for rash.   Musculoskeletal: Negative for myalgias.   Gastrointestinal: Negative for heartburn, melena and nausea.   Genitourinary: Negative for dysuria and hematuria.   Neurological: Negative for dizziness and light-headedness.   Psychiatric/Behavioral: The patient does not have insomnia and is not nervous/anxious.         BP Readings from Last 5 Encounters:   01/20/21 128/80   11/18/20 120/84   11/20/19 130/92   11/21/18 112/70   11/15/17 138/82       Wt Readings from Last 5 Encounters:   01/20/21 (!) 138 kg (304 lb)   11/18/20 135 kg (298 lb)   11/27/19 135 kg (297 lb 9.9 oz)   11/20/19 135 kg (298 lb)   11/21/18 134 kg (295 lb)       Objective     /80 (BP Location: Right arm)   Pulse 76   Temp 98.6 °F (37 °C)   Ht 193 cm (75.98\")   Wt (!) 138 kg (304 lb)   BMI 37.02 kg/m²     Vitals signs and nursing note reviewed.   Eyes:      Pupils: Pupils are equal, round, and reactive to light.   HENT:      Head: Normocephalic.   Neck:      Musculoskeletal: Normal range of motion.   Pulmonary:      Breath sounds: Normal breath sounds.   Cardiovascular:      Normal rate. Regular rhythm.   Edema:     Peripheral edema absent.   Abdominal:      General: Bowel sounds are normal.      Palpations: Abdomen is soft.   Musculoskeletal: Normal range of motion.   Skin:     General: Skin is warm.   Neurological:      Mental Status: Alert and oriented to person, place, and time.          Procedures: none today          Assessment/Plan   Diagnoses and all orders for this visit:    1. Coronary-myocardial bridge (Primary)    2. Endothelial dysfunction of coronary artery    3. Essential hypertension    4. Mixed hyperlipidemia    5. Anxiety    6. Morbidly obese (CMS/HCC)    Other orders  -     Arginine 1000 MG tablet; Take 1,000 mg by " mouth 2 (two) times a day.  Dispense: 60 each; Refill: 8      Myocardial bridge?endothelial dysfx-the report of his recent cardiac catheterization reviewed.  Evidence of myocardial bridge and possible endothelial dysfunction noted.  Advised to continue L-arginine.  Continue statin therapy for prevention of disease.  Continue current BP management with beta blocker which also helps control palpitations. Manage stress and anxiety. Avoid caffeine and maintain good hydration.     HTN- normal today. Continue medication management and DASH diet. Weight loss strongly encouraged.     Hyperlipidemia- continue statin therapy. He follows with you for lab orders/management. Please forward copy of next lab results.     Anxiety- currently managed. Uses Xanax prn.     Obesity- Patient's Body mass index is 37.02 kg/m². BMI is above normal parameters. Recommendations include: nutrition counseling. Informational handouts on mediterranean diet and benefits of exercise given to him.      Bishnu Renee  reports that he quit smoking about 9 years ago. He has never used smokeless tobacco.  I complimented him on maintaining smoking cessation.     A yearly follow up visit scheduled. Please call sooner for cardiac concerns.              Electronically signed by CHAD Ayon,  January 22, 2021 11:26 EST

## 2021-01-20 NOTE — PATIENT INSTRUCTIONS
Mediterranean Diet  A Mediterranean diet refers to food and lifestyle choices that are based on the traditions of countries located on the [place]. This way of eating has been shown to help prevent certain conditions and improve outcomes for people who have chronic diseases, like kidney disease and heart disease.  What are tips for following this plan?  Lifestyle  · Cook and eat meals together with your family, when possible.  · Drink enough fluid to keep your urine clear or pale yellow.  · Be physically active every day. This includes:  ? Aerobic exercise like running or swimming.  ? Leisure activities like gardening, walking, or housework.  · Get 7-8 hours of sleep each night.  · If recommended by your health care provider, drink red wine in moderation. This means 1 glass a day for nonpregnant women and 2 glasses a day for men. A glass of wine equals 5 oz (150 mL).  Reading food labels    · Check the serving size of packaged foods. For foods such as rice and pasta, the serving size refers to the amount of cooked product, not dry.  · Check the total fat in packaged foods. Avoid foods that have saturated fat or trans fats.  · Check the ingredients list for added sugars, such as corn syrup.  Shopping  · At the grocery store, buy most of your food from the areas near the walls of the store. This includes:  ? Fresh fruits and vegetables (produce).  ? Grains, beans, nuts, and seeds. Some of these may be available in unpackaged forms or large amounts (in bulk).  ? Fresh seafood.  ? Poultry and eggs.  ? Low-fat dairy products.  · Buy whole ingredients instead of prepackaged foods.  · Buy fresh fruits and vegetables in-season from local farmers markets.  · Buy frozen fruits and vegetables in resealable bags.  · If you do not have access to quality fresh seafood, buy precooked frozen shrimp or canned fish, such as tuna, salmon, or sardines.  · Buy small amounts of raw or cooked vegetables, salads, or olives from  the deli or salad bar at your store.  · Stock your pantry so you always have certain foods on hand, such as olive oil, canned tuna, canned tomatoes, rice, pasta, and beans.  Cooking  · Cook foods with extra-virgin olive oil instead of using butter or other vegetable oils.  · Have meat as a side dish, and have vegetables or grains as your main dish. This means having meat in small portions or adding small amounts of meat to foods like pasta or stew.  · Use beans or vegetables instead of meat in common dishes like chili or lasagna.  · Ida with different cooking methods. Try roasting or broiling vegetables instead of steaming or sautéeing them.  · Add frozen vegetables to soups, stews, pasta, or rice.  · Add nuts or seeds for added healthy fat at each meal. You can add these to yogurt, salads, or vegetable dishes.  · Marinate fish or vegetables using olive oil, lemon juice, garlic, and fresh herbs.  Meal planning    · Plan to eat 1 vegetarian meal one day each week. Try to work up to 2 vegetarian meals, if possible.  · Eat seafood 2 or more times a week.  · Have healthy snacks readily available, such as:  ? Vegetable sticks with hummus.  ? Greek yogurt.  ? Fruit and nut trail mix.  · Eat balanced meals throughout the week. This includes:  ? Fruit: 2-3 servings a day  ? Vegetables: 4-5 servings a day  ? Low-fat dairy: 2 servings a day  ? Fish, poultry, or lean meat: 1 serving a day  ? Beans and legumes: 2 or more servings a week  ? Nuts and seeds: 1-2 servings a day  ? Whole grains: 6-8 servings a day  ? Extra-virgin olive oil: 3-4 servings a day  · Limit red meat and sweets to only a few servings a month  What are my food choices?  · Mediterranean diet  ? Recommended  § Grains: Whole-grain pasta. Brown rice. Bulgar wheat. Polenta. Couscous. Whole-wheat bread. Oatmeal. Quinoa.  § Vegetables: Artichokes. Beets. Broccoli. Cabbage. Carrots. Eggplant. Green beans. Chard. Kale. Spinach. Onions. Leeks. Peas. Squash.  Tomatoes. Peppers. Radishes.  § Fruits: Apples. Apricots. Avocado. Berries. Bananas. Cherries. Dates. Figs. Grapes. Valeria. Melon. Oranges. Peaches. Plums. Pomegranate.  § Meats and other protein foods: Beans. Almonds. Sunflower seeds. Pine nuts. Peanuts. Cod. [place]. Scallops. Shrimp. Tuna. Tilapia. Clams. Oysters. Eggs.  § Dairy: Low-fat milk. Cheese. Greek yogurt.  § Beverages: Water. Red wine. Herbal tea.  § Fats and oils: Extra virgin olive oil. Avocado oil. Grape seed oil.  § Sweets and desserts: Greek yogurt with honey. Baked apples. Poached pears. Trail mix.  § Seasoning and other foods: Basil. Cilantro. Coriander. Cumin. Mint. Parsley. Gelacio. Rosemary. Tarragon. Garlic. Oregano. Thyme. Pepper. Balsalmic vinegar. Tahini. Hummus. Tomato sauce. Olives. Mushrooms.  ? Limit these  § Grains: Prepackaged pasta or rice dishes. Prepackaged cereal with added sugar.  § Vegetables: Deep fried potatoes (french fries).  § Fruits: Fruit canned in syrup.  § Meats and other protein foods: Beef. Pork. Lamb. Poultry with skin. Hot dogs. Galdamez.  § Dairy: Ice cream. Sour cream. Whole milk.  § Beverages: Juice. Sugar-sweetened soft drinks. Beer. Liquor and spirits.  § Fats and oils: Butter. Canola oil. Vegetable oil. Beef fat (tallow). Lard.  § Sweets and desserts: Cookies. Cakes. Pies. Candy.  § Seasoning and other foods: Mayonnaise. Premade sauces and marinades.  The items listed may not be a complete list. Talk with your dietitian about what dietary choices are right for you.  Summary  · The Mediterranean diet includes both food and lifestyle choices.  · Eat a variety of fresh fruits and vegetables, beans, nuts, seeds, and whole grains.  · Limit the amount of red meat and sweets that you eat.  · Talk with your health care provider about whether it is safe for you to drink red wine in moderation. This means 1 glass a day for nonpregnant women and 2 glasses a day for men. A glass of wine equals 5 oz (150 mL).  This information  is not intended to replace advice given to you by your health care provider. Make sure you discuss any questions you have with your health care provider.  Document Revised: 08/17/2017 Document Reviewed: 08/10/2017  Elsevier Patient Education © 2020 [employer]  Physical Activity With Heart Disease  Being active has many benefits, especially if you have heart disease. Physical activity can help you do more and feel healthier. Start slowly, and increase the amount of time you spend being active. Most adults should aim for physical activity that:  · Makes you breathe harder and raises your heart rate (aerobic activity). Try to get at least 150 minutes of aerobic activity each week. This is about 30 minutes each day, 5 days a week.  · Helps build muscle strength (strengthening activity). Do this at least 2 times a week.  Always talk with your health care provider before starting any new activity program or if you have any changes in your condition.  What are the benefits of physical activity?  When you have heart disease, physical activity can help:  · Lower your blood pressure.  · Lower your cholesterol.  · Control your weight.  · Improve your sleep.  · Help control your blood sugar.  · Improve your heart and lung function.  · Reduce your risk for blood clots (thrombophlebitis).  · Improve your energy level.  · Reduce stress.  What are some types of physical activity I could try?  There are many ways to be active. Talk with your health care provider about what types and intensity of activity is right for you.  Aerobic activity    Aerobic (cardiovascular) activity can be moderate or vigorous intensity, depending on how hard you are working.  Moderate-intensity activity includes:  · Walking.  · Slow bicycling.  · Water aerobics.  · Dancing.  · Light gardening or house work.  Vigorous-intensity activity includes:  · Jogging or running.  · Stair climbing.  · Swimming laps.  · Hiking uphill.  · Heavy gardening, such as  digging trenches.    Strengthening activity  Strengthening activities work your muscles to build strength. Some examples include:  · Doing push-ups, sit-ups, or pull-ups.  · Lifting small weights.  · Using resistance bands.    Flexibility  Flexibility activities lengthen your muscles to keep them flexible and less tight and improve your balance. Some examples include:  · Stretching.  · Yoga.  · Juan David chi.  · Ballet barre.    Follow these instructions at home:  How to get started  · Talk with your health care provider about:  ? What types of activities are safe for you.  ? If you should check your pulse or take other precautions during physical activity.  · Get a calendar. Write down a schedule and plan for your new routine.  · Take time to find out what works for you. Consider:  ? Joining a community program, such as a biking group, yoga class, local gym, or swimming pool membership.  ? Be active on your own by downloading free workout applications on a smartphone or other devices, or by purchasing workout DVDs.  · If you have not been active, begin with sessions that last 10-15 minutes. Gradually work up to sessions that last 20-30 minutes, 5 times a week. Follow all of your health care provider's recommendations.  · Be patient with yourself. It takes time to build up strength and lung capacity.  Safety  · Exercise in an indoor, climate-controlled facility, as told by your health care provider. You may need to do this if:  ? There are extreme outdoor conditions, such as heat, humidity, or cold.  ? There is an air pollution advisory. Your local news, board of health, or hospital can provide information on air quality.  · Take extra precautions as told by your health care provider. This may include:  ? Monitoring your heart rate.  ? Avoiding heavy lifting.  ? Understanding how your medicines can affect you during physical activity. Certain medicines may cause heat intolerance or changes in blood sugar.  ? Slowing down  to rest when you need to.  ? Keeping nitroglycerin spray and tablets with you at all times if you have angina. Use them as told to prevent and treat symptoms.  · Drink plenty of water before, during, and after physical activity.  · Know what symptoms may be signs of a problem. Stop physical activity right away if you have any of these symptoms.  Get help right away if you have any of the following during exercise:  · Chest pain, shortness of breath, or feel very tired.  · Pain in the arm, shoulder, neck, or jaw.  · Feel weak, dizzy, or light-headed.  · An irregular heart rate, or your heart rate is greater than 100 beats per minute (bpm) before exercise.  These symptoms may represent a serious problem that is an emergency. Do not wait to see if the symptoms go away. Get medical help right away. Call your local emergency services (911 in the [place] Do not drive yourself to the hospital.   Summary  · Physical activity has many benefits, especially if you have heart disease.  · Before starting an activity program, talk with your health care provider about how often to be active and what type of activity is safe for you.  · Your physical activity plan may include moderate or vigorous aerobic activity, strengthening activities, and flexibility.  · Know what symptoms may be signs of a problem. Stop physical activity right away and call emergency services (911 in the [place] if you have any of these symptoms.  This information is not intended to replace advice given to you by your health care provider. Make sure you discuss any questions you have with your health care provider.  Document Revised: 01/09/2019 Document Reviewed: 01/09/2019  Elsevier Patient Education © 2020 [employer] Inc[employer]

## 2021-01-22 PROBLEM — E66.01 MORBIDLY OBESE (HCC): Status: ACTIVE | Noted: 2021-01-22

## 2021-01-22 PROBLEM — Q24.5 CORONARY-MYOCARDIAL BRIDGE: Status: ACTIVE | Noted: 2021-01-22

## 2021-01-22 PROBLEM — I25.10 ENDOTHELIAL DYSFUNCTION OF CORONARY ARTERY: Status: ACTIVE | Noted: 2021-01-22

## 2021-11-22 RX ORDER — NADOLOL 40 MG/1
TABLET ORAL
Qty: 180 TABLET | Refills: 2 | Status: SHIPPED | OUTPATIENT
Start: 2021-11-22 | End: 2022-11-03 | Stop reason: SDUPTHER

## 2021-11-22 RX ORDER — PRAVASTATIN SODIUM 20 MG
TABLET ORAL
Qty: 90 TABLET | Refills: 3 | Status: SHIPPED | OUTPATIENT
Start: 2021-11-22 | End: 2022-11-29 | Stop reason: SDUPTHER

## 2022-11-04 RX ORDER — NADOLOL 40 MG/1
TABLET ORAL
Qty: 60 TABLET | Refills: 0 | Status: SHIPPED | OUTPATIENT
Start: 2022-11-04 | End: 2022-11-29 | Stop reason: SDUPTHER

## 2022-11-29 ENCOUNTER — OFFICE VISIT (OUTPATIENT)
Dept: CARDIOLOGY | Facility: CLINIC | Age: 42
End: 2022-11-29

## 2022-11-29 VITALS
BODY MASS INDEX: 38.24 KG/M2 | DIASTOLIC BLOOD PRESSURE: 82 MMHG | SYSTOLIC BLOOD PRESSURE: 120 MMHG | HEART RATE: 72 BPM | HEIGHT: 74 IN | WEIGHT: 298 LBS

## 2022-11-29 DIAGNOSIS — E78.2 MIXED HYPERLIPIDEMIA: ICD-10-CM

## 2022-11-29 DIAGNOSIS — R00.2 PALPITATION: ICD-10-CM

## 2022-11-29 DIAGNOSIS — I10 PRIMARY HYPERTENSION: ICD-10-CM

## 2022-11-29 DIAGNOSIS — I25.10 ENDOTHELIAL DYSFUNCTION OF CORONARY ARTERY: ICD-10-CM

## 2022-11-29 DIAGNOSIS — Q24.5 CORONARY-MYOCARDIAL BRIDGE: Primary | ICD-10-CM

## 2022-11-29 DIAGNOSIS — F41.9 ANXIETY: ICD-10-CM

## 2022-11-29 PROCEDURE — 99213 OFFICE O/P EST LOW 20 MIN: CPT | Performed by: NURSE PRACTITIONER

## 2022-11-29 RX ORDER — PRAVASTATIN SODIUM 20 MG
20 TABLET ORAL DAILY
Qty: 90 TABLET | Refills: 4 | Status: SHIPPED | OUTPATIENT
Start: 2022-11-29

## 2022-11-29 RX ORDER — NADOLOL 40 MG/1
TABLET ORAL
Qty: 180 TABLET | Refills: 4 | Status: SHIPPED | OUTPATIENT
Start: 2022-11-29 | End: 2022-11-29 | Stop reason: SDUPTHER

## 2022-11-29 RX ORDER — NADOLOL 40 MG/1
TABLET ORAL
Qty: 180 TABLET | Refills: 4 | Status: SHIPPED | OUTPATIENT
Start: 2022-11-29

## 2023-07-31 ENCOUNTER — TELEPHONE (OUTPATIENT)
Dept: CARDIOLOGY | Facility: CLINIC | Age: 43
End: 2023-07-31
Payer: COMMERCIAL

## 2023-07-31 RX ORDER — BISOPROLOL FUMARATE 5 MG/1
5 TABLET, FILM COATED ORAL DAILY
Qty: 30 TABLET | Refills: 3 | Status: SHIPPED | OUTPATIENT
Start: 2023-07-31

## 2023-11-29 ENCOUNTER — OFFICE VISIT (OUTPATIENT)
Dept: CARDIOLOGY | Facility: CLINIC | Age: 43
End: 2023-11-29
Payer: COMMERCIAL

## 2023-11-29 VITALS
DIASTOLIC BLOOD PRESSURE: 94 MMHG | HEIGHT: 74 IN | BODY MASS INDEX: 39.99 KG/M2 | SYSTOLIC BLOOD PRESSURE: 150 MMHG | HEART RATE: 64 BPM | WEIGHT: 311.6 LBS

## 2023-11-29 DIAGNOSIS — I10 PRIMARY HYPERTENSION: ICD-10-CM

## 2023-11-29 DIAGNOSIS — R00.2 PALPITATION: Primary | ICD-10-CM

## 2023-11-29 DIAGNOSIS — Q24.5 CORONARY-MYOCARDIAL BRIDGE: ICD-10-CM

## 2023-11-29 DIAGNOSIS — E78.2 MIXED HYPERLIPIDEMIA: ICD-10-CM

## 2023-11-29 PROCEDURE — 99213 OFFICE O/P EST LOW 20 MIN: CPT | Performed by: NURSE PRACTITIONER

## 2023-11-29 RX ORDER — PRAVASTATIN SODIUM 20 MG
20 TABLET ORAL DAILY
Qty: 90 TABLET | Refills: 4 | Status: SHIPPED | OUTPATIENT
Start: 2023-11-29

## 2023-11-29 RX ORDER — NADOLOL 40 MG/1
40 TABLET ORAL 2 TIMES DAILY
Qty: 180 TABLET | Refills: 4 | Status: SHIPPED | OUTPATIENT
Start: 2023-11-29

## 2023-11-29 RX ORDER — NADOLOL 40 MG/1
40 TABLET ORAL 2 TIMES DAILY
COMMUNITY
End: 2023-11-29 | Stop reason: SDUPTHER

## 2023-11-29 NOTE — PROGRESS NOTES
Chief Complaint   Patient presents with    Follow-up     Cardiac management    Lab     He thinks had labs in August at [facility].    Bisoprolol     He took for about a month, heart rate was increasing to over 100 throughout the day. He restarted Nadolol 40 mg.    Med Refill     Needs refills on pravastatin-90 day     Subjective       Bishnu Renee is a 43 y.o. malewith tachycardia, hyperlipidemia and abnormal stress test.  Cardiac cath showed myocardial bridging of the LAD and possible endothelial dysfunction.  Corgard added for palpitations.  Monitor showed no significant arrhythmia.     He returns today for regular follow-up. We tried changing beta blocker after he experienced more palpitations but bisoprolol did not control heart rate. He resumes Corgard. During this time, he also quit vaping and was experiencing some withdrawal. Now he is doing well. Labs rechecked in August at [facility]. Copy not available. In 2022, glucose 116, BUN/CR 13/1.03, normal electrolytes, , , HDL 35, LDL 72, LFT normal, CBC normal.  TSH 1.28.  PSA 0.6.        Cardiac History:    Past Surgical History:   Procedure Laterality Date    CARDIAC CATHETERIZATION  12/01/2020    Mypocardial Bridging of LAD    CARDIOVASCULAR STRESS TEST  06/17/2013    Stress- ([facility]) 8 Min, 85% THR. /90. Negative.     CARDIOVASCULAR STRESS TEST  11/27/2019    R.Stress- 7 Min, 15 Secs. 10.1 METS. 77% THR. BP- 171/64. Inconclusive test.    CARDIOVASCULAR STRESS TEST  12/10/2019    L. Cardiolite- EF 56%. R/O Small Lateral Ischemia.    CONVERTED (HISTORICAL) HOLTER  01/09/2014    Holter ([facility])- AVG HR 70 BPM.     ECHO - CONVERTED  01/13/2014    Echo ([facility])- EF 65%     ECHO - CONVERTED  11/27/2019    TLS. EF 65%. Mild MR. LA- 4.1 Cm. RVSP- 13v mmHg.    OTHER SURGICAL HISTORY  01/13/2014    CTA renals ([facility])- Normal     OTHER SURGICAL HISTORY  03/20/2014    Ecardio- Sinus      Current Outpatient Medications   Medication Sig Dispense Refill     ALPRAZolam (XANAX) 1 MG tablet Take 1 tablet by mouth 2 (Two) Times a Day As Needed for Anxiety.      Arginine 1000 MG tablet Take 1,000 mg by mouth 2 (two) times a day. 60 each 8    aspirin 81 MG EC tablet Take 1 tablet by mouth Daily.      nadolol (CORGARD) 40 MG tablet Take 1 tablet by mouth 2 (Two) Times a Day. 180 tablet 4    pravastatin (PRAVACHOL) 20 MG tablet Take 1 tablet by mouth Daily. 90 tablet 4     No current facility-administered medications for this visit.     Patient has no known allergies.    Past Medical History:   Diagnosis Date    Anxiety     H/O hernia repair     Hypertension     Palpitations     SOB (shortness of breath)      Social History     Socioeconomic History    Marital status:    Tobacco Use    Smoking status: Former     Years: 6     Types: Cigarettes     Quit date:      Years since quittin.9    Smokeless tobacco: Never   Vaping Use    Vaping Use: Former    Start date: 2023    Substances: Nicotine, Flavoring    Devices: Disposable   Substance and Sexual Activity    Alcohol use: Yes     Comment: rarely drinks a beer    Drug use: No    Sexual activity: Defer     Family History   Problem Relation Age of Onset    Hyperlipidemia Mother     Asthma Brother      Review of Systems   Constitutional: Positive for weight gain. Negative for decreased appetite and malaise/fatigue.   HENT: Negative.     Eyes:  Negative for blurred vision.   Cardiovascular:  Negative for chest pain, dyspnea on exertion, leg swelling, palpitations and syncope.   Respiratory:  Negative for shortness of breath and sleep disturbances due to breathing.    Endocrine: Negative.    Hematologic/Lymphatic: Negative for bleeding problem. Does not bruise/bleed easily.   Skin: Negative.    Musculoskeletal:  Negative for falls and myalgias.   Gastrointestinal:  Negative for abdominal pain, heartburn and melena.   Genitourinary:  Negative for hematuria.   Neurological:  Negative for dizziness and  "light-headedness.   Psychiatric/Behavioral:  Negative for altered mental status.    Allergic/Immunologic: Negative.       Objective     /94 (BP Location: Right arm)   Pulse 64   Ht 188 cm (74\")   Wt (!) 141 kg (311 lb 9.6 oz)   BMI 40.01 kg/m²     Vitals and nursing note reviewed.   Constitutional:       General: Not in acute distress.     Appearance: Well-developed. Not diaphoretic.   Eyes:      Pupils: Pupils are equal, round, and reactive to light.   HENT:      Head: Normocephalic.   Pulmonary:      Effort: Pulmonary effort is normal. No respiratory distress.      Breath sounds: Normal breath sounds.   Cardiovascular:      Normal rate. Regular rhythm.   Pulses:     Intact distal pulses.   Edema:     Peripheral edema absent.   Abdominal:      General: Bowel sounds are normal.      Palpations: Abdomen is soft.   Musculoskeletal: Normal range of motion.      Cervical back: Normal range of motion. Skin:     General: Skin is warm and dry.   Neurological:      Mental Status: Alert and oriented to person, place, and time.        Procedures          Problem List Items Addressed This Visit          Cardiac and Vasculature    Palpitation - Primary    HTN (hypertension)    Relevant Medications    nadolol (CORGARD) 40 MG tablet    Mixed hyperlipidemia    Relevant Medications    pravastatin (PRAVACHOL) 20 MG tablet    Coronary-myocardial bridge    Relevant Medications    nadolol (CORGARD) 40 MG tablet      Palpitations  -well controlled with corgard  -continue same plan    HTN  -bp elevated today  -this is an unusual reading for him  -advised to check bp 2 daily x 1 week and report  -may need low dose losartan or lisinopril    Hypercholesterolemia  -managed with pravastatin  -well tolerate and lipids well controlled    Myocardial bridging  -no significant anginal pain  -continue to monitor    Class 3 Severe Obesity (BMI >=40). Obesity-related health conditions include the following: obstructive sleep apnea, " hypertension, coronary heart disease, diabetes mellitus, dyslipidemias, and GERD. Obesity is worsening. BMI is is above average; BMI management plan is completed. We discussed portion control and increasing exercise.              Electronically signed by CHAD Machado,  December 1, 2023 11:12 EST

## 2024-12-02 ENCOUNTER — OFFICE VISIT (OUTPATIENT)
Dept: CARDIOLOGY | Facility: CLINIC | Age: 44
End: 2024-12-02
Payer: COMMERCIAL

## 2024-12-02 VITALS
BODY MASS INDEX: 37.55 KG/M2 | WEIGHT: 292.6 LBS | HEART RATE: 68 BPM | DIASTOLIC BLOOD PRESSURE: 98 MMHG | SYSTOLIC BLOOD PRESSURE: 130 MMHG | HEIGHT: 74 IN

## 2024-12-02 DIAGNOSIS — I10 PRIMARY HYPERTENSION: ICD-10-CM

## 2024-12-02 DIAGNOSIS — I25.10 ENDOTHELIAL DYSFUNCTION OF CORONARY ARTERY: ICD-10-CM

## 2024-12-02 DIAGNOSIS — E78.2 MIXED HYPERLIPIDEMIA: ICD-10-CM

## 2024-12-02 DIAGNOSIS — Q24.5 CORONARY-MYOCARDIAL BRIDGE: ICD-10-CM

## 2024-12-02 DIAGNOSIS — E11.9 TYPE 2 DIABETES MELLITUS WITHOUT COMPLICATION, WITHOUT LONG-TERM CURRENT USE OF INSULIN: ICD-10-CM

## 2024-12-02 DIAGNOSIS — R00.2 PALPITATION: Primary | ICD-10-CM

## 2024-12-02 RX ORDER — LOSARTAN POTASSIUM 50 MG/1
50 TABLET ORAL DAILY
Qty: 90 TABLET | Refills: 3 | Status: SHIPPED | OUTPATIENT
Start: 2024-12-02

## 2024-12-02 RX ORDER — PRAVASTATIN SODIUM 20 MG
20 TABLET ORAL DAILY
Qty: 90 TABLET | Refills: 4 | Status: SHIPPED | OUTPATIENT
Start: 2024-12-02

## 2024-12-02 RX ORDER — OMEPRAZOLE 40 MG/1
40 CAPSULE, DELAYED RELEASE ORAL DAILY
COMMUNITY

## 2024-12-02 RX ORDER — NADOLOL 40 MG/1
40 TABLET ORAL 2 TIMES DAILY
Qty: 180 TABLET | Refills: 4 | Status: SHIPPED | OUTPATIENT
Start: 2024-12-02

## 2024-12-02 RX ORDER — PAROXETINE 20 MG/1
20 TABLET, FILM COATED ORAL EVERY MORNING
COMMUNITY

## 2024-12-02 NOTE — PROGRESS NOTES
Chief Complaint   Patient presents with    Follow-up     Cardiac management    Lab     Has copy of most recent labs.    Chest Pain     Rarely has a sharp pain in left chest that is very short in duration.    Blood pressure     Has been elevated for the last few months, averaging 135-140/90s.    Palpitations     Notices couple times monthly. He does not drink caffeine.    Med Refill     Needs refills on Nadolol and pravastatin-90 day    Weight loss     Has only been eating once daily.     Subjective       Bishnu Renee is a 44 y.o. male with tachycardia, hyperlipidemia and abnormal stress test.  Cardiac cath showed myocardial bridging of the LAD and possible endothelial dysfunction. Corgard added for palpitations.  Monitor showed no significant arrhythmia.     He returns today for follow-up.  Palpitations remain well-controlled with Corgard.  BP consistently more elevated 135-140/90s.  Labs brought in today showed on 7/27/2024, glucose elevated at 137 otherwise normal CMP.  Normal CBC.  TSH 1.01.  , , HDL 37, LDL 70.  He reports A1c elevated >6.5%.  He has started metformin.  He is watching diet much closer.  Eating 1 meal per day. Weight is down 19 pounds.         Cardiac History:    Past Surgical History:   Procedure Laterality Date    CARDIAC CATHETERIZATION  12/01/2020    Mypocardial Bridging of LAD    CARDIOVASCULAR STRESS TEST  06/17/2013    Stress- ([facility]) 8 Min, 85% THR. /90. Negative.     CARDIOVASCULAR STRESS TEST  11/27/2019    R.Stress- 7 Min, 15 Secs. 10.1 METS. 77% THR. BP- 171/64. Inconclusive test.    CARDIOVASCULAR STRESS TEST  12/10/2019    L. Cardiolite- EF 56%. R/O Small Lateral Ischemia.    CONVERTED (HISTORICAL) HOLTER  01/09/2014    Holter ([facility])- AVG HR 70 BPM.     ECHO - CONVERTED  01/13/2014    Echo ([facility])- EF 65%     ECHO - CONVERTED  11/27/2019    TLS. EF 65%. Mild MR. LA- 4.1 Cm. RVSP- 13v mmHg.    OTHER SURGICAL HISTORY  01/13/2014    CTA renals ([facility])- Normal     OTHER  SURGICAL HISTORY  2014    Ecardio- Sinus      Current Outpatient Medications   Medication Sig Dispense Refill    ALPRAZolam (XANAX) 1 MG tablet Take 1 tablet by mouth 2 (Two) Times a Day As Needed for Anxiety.      Arginine 1000 MG tablet Take 1,000 mg by mouth 2 (two) times a day. 60 each 8    aspirin 81 MG EC tablet Take 1 tablet by mouth Daily.      metFORMIN (GLUCOPHAGE) 500 MG tablet Take 1 tablet by mouth Daily With Breakfast.      nadolol (CORGARD) 40 MG tablet Take 1 tablet by mouth 2 (Two) Times a Day. 180 tablet 4    omeprazole (priLOSEC) 40 MG capsule Take 1 capsule by mouth Daily.      PARoxetine (PAXIL) 20 MG tablet Take 1 tablet by mouth Every Morning.      pravastatin (PRAVACHOL) 20 MG tablet Take 1 tablet by mouth Daily. 90 tablet 4    losartan (Cozaar) 50 MG tablet Take 1 tablet by mouth Daily. 90 tablet 3     No current facility-administered medications for this visit.     Patient has no known allergies.    Past Medical History:   Diagnosis Date    Anxiety     Diabetes mellitus     H/O hernia repair     Hypertension     Palpitations     SOB (shortness of breath)      Social History     Socioeconomic History    Marital status:    Tobacco Use    Smoking status: Former     Current packs/day: 0.00     Types: Cigarettes     Start date:      Quit date:      Years since quittin.9     Passive exposure: Past    Smokeless tobacco: Never   Vaping Use    Vaping status: Former    Start date: 2023    Substances: Nicotine, Flavoring    Devices: Disposable   Substance and Sexual Activity    Alcohol use: Yes     Comment: rarely drinks a beer    Drug use: No    Sexual activity: Defer     Family History   Problem Relation Age of Onset    Hyperlipidemia Mother     Asthma Brother      Review of Systems   Constitutional: Positive for weight loss (-19). Negative for decreased appetite and malaise/fatigue.   HENT: Negative.     Eyes:  Negative for blurred vision.   Cardiovascular:  Positive  "for palpitations (Brief). Negative for chest pain, dyspnea on exertion, leg swelling and syncope.   Respiratory:  Negative for shortness of breath and sleep disturbances due to breathing.    Endocrine: Negative.    Hematologic/Lymphatic: Negative for bleeding problem. Does not bruise/bleed easily.   Skin: Negative.    Musculoskeletal:  Negative for falls and myalgias.   Gastrointestinal:  Negative for abdominal pain, heartburn and melena.   Genitourinary:  Negative for hematuria.   Neurological:  Negative for dizziness and light-headedness.   Psychiatric/Behavioral:  Negative for altered mental status.    Allergic/Immunologic: Negative.       Objective     /98 (BP Location: Left arm, Patient Position: Sitting)   Pulse 68   Ht 188 cm (74.02\")   Wt 133 kg (292 lb 9.6 oz)   BMI 37.55 kg/m²     Vitals and nursing note reviewed.   Constitutional:       General: Not in acute distress.     Appearance: Well-developed. Not diaphoretic.   Eyes:      Pupils: Pupils are equal, round, and reactive to light.   HENT:      Head: Normocephalic.   Pulmonary:      Effort: Pulmonary effort is normal. No respiratory distress.      Breath sounds: Normal breath sounds.   Cardiovascular:      Normal rate. Regular rhythm.   Pulses:     Intact distal pulses.   Edema:     Peripheral edema absent.   Abdominal:      General: Bowel sounds are normal.      Palpations: Abdomen is soft.   Musculoskeletal: Normal range of motion.      Cervical back: Normal range of motion. Skin:     General: Skin is warm and dry.   Neurological:      Mental Status: Alert and oriented to person, place, and time.        Procedures          Problem List Items Addressed This Visit          Cardiac and Vasculature    Palpitation - Primary    HTN (hypertension)    Relevant Medications    losartan (Cozaar) 50 MG tablet    nadolol (CORGARD) 40 MG tablet    Mixed hyperlipidemia    Relevant Medications    pravastatin (PRAVACHOL) 20 MG tablet    Coronary-myocardial " bridge    Relevant Medications    nadolol (CORGARD) 40 MG tablet    Endothelial dysfunction of coronary artery    Relevant Medications    nadolol (CORGARD) 40 MG tablet       Endocrine and Metabolic    Type 2 diabetes mellitus without complication, without long-term current use of insulin    Relevant Medications    metFORMIN (GLUCOPHAGE) 500 MG tablet      Palpitations  -Well-controlled with Corgard  -Heart rate and rhythm normal on exam    HTN  -Elevated today at 130/98  -Multiple elevated readings at home  -Start losartan 50 mg daily  -Monitor BP and keep log for 1 to 2 weeks and report  -Will titrate dose as needed    Hypercholesterolemia  -LDL 70, at goal  -Continue pravastatin    Myocardial bridging  -No significant anginal pain    T2DM  -Newly diagnosed with A1c 6.8%  -Metformin added per Dr. Lester  -Dietary changes, 19 pound weight loss  -Add losartan for BP control and renovascular protection  -Continue aspirin and statin    Further recommendations to follow response to losartan.  Follow-up in 6 months, sooner as needed.    Class 2 Severe Obesity (BMI >=35 and <=39.9). Obesity-related health conditions include the following: obstructive sleep apnea, hypertension, coronary heart disease, diabetes mellitus, impaired fasting glucose, and dyslipidemias. Obesity is improving with lifestyle modifications. BMI is is above average; BMI management plan is completed. We discussed portion control and increasing exercise.               Electronically signed by CHAD Machado,  December 4, 2024 10:15 EST

## 2024-12-04 PROBLEM — E11.9 TYPE 2 DIABETES MELLITUS WITHOUT COMPLICATION, WITHOUT LONG-TERM CURRENT USE OF INSULIN: Status: ACTIVE | Noted: 2024-12-04

## 2025-06-17 ENCOUNTER — OFFICE VISIT (OUTPATIENT)
Dept: CARDIOLOGY | Facility: CLINIC | Age: 45
End: 2025-06-17
Payer: COMMERCIAL

## 2025-06-17 VITALS
DIASTOLIC BLOOD PRESSURE: 80 MMHG | HEART RATE: 62 BPM | WEIGHT: 292 LBS | BODY MASS INDEX: 37.47 KG/M2 | SYSTOLIC BLOOD PRESSURE: 122 MMHG | HEIGHT: 74 IN

## 2025-06-17 DIAGNOSIS — R00.2 PALPITATION: Primary | ICD-10-CM

## 2025-06-17 DIAGNOSIS — Q24.5 CORONARY-MYOCARDIAL BRIDGE: ICD-10-CM

## 2025-06-17 DIAGNOSIS — E78.2 MIXED HYPERLIPIDEMIA: ICD-10-CM

## 2025-06-17 DIAGNOSIS — E11.9 TYPE 2 DIABETES MELLITUS WITHOUT COMPLICATION, WITHOUT LONG-TERM CURRENT USE OF INSULIN: ICD-10-CM

## 2025-06-17 DIAGNOSIS — I25.10 ENDOTHELIAL DYSFUNCTION OF CORONARY ARTERY: ICD-10-CM

## 2025-06-17 DIAGNOSIS — I10 PRIMARY HYPERTENSION: ICD-10-CM

## 2025-06-17 RX ORDER — LOSARTAN POTASSIUM 50 MG/1
50 TABLET ORAL DAILY
Qty: 90 TABLET | Refills: 3 | Status: SHIPPED | OUTPATIENT
Start: 2025-06-17

## 2025-06-17 RX ORDER — PRAVASTATIN SODIUM 20 MG
20 TABLET ORAL DAILY
Qty: 90 TABLET | Refills: 4 | Status: SHIPPED | OUTPATIENT
Start: 2025-06-17

## 2025-06-17 RX ORDER — NADOLOL 40 MG/1
40 TABLET ORAL 2 TIMES DAILY
Qty: 180 TABLET | Refills: 4 | Status: SHIPPED | OUTPATIENT
Start: 2025-06-17

## 2025-06-17 NOTE — PROGRESS NOTES
Chief Complaint   Patient presents with    Follow-up     Cardiac management    Lab     Last labs in August at [facility].    Chest Pain     Has occasional dull pain in left chest, short in duration.    Palpitations     Has occasional, not often.    Med Refill     Needs refills on cardiac medications-90 day     Subjective     HPI    Bishnu Renee is a 44 y.o. male with tachycardia, hyperlipidemia and abnormal stress test.  Cardiac cath showed myocardial bridging of the LAD and possible endothelial dysfunction. Corgard added for palpitations.  Monitor showed no significant arrhythmia.Losartan 50 mg added for elevated bp.      He returns today for follow-up. Occasional palpitations but mostly well-controlled with Corgard.  BP well controlled after adding losartan. Tolerating well without side effects. Labs done yearly at [facility]. On 7/27/2024, glucose elevated at 137 otherwise normal CMP.  Normal CBC.  TSH 1.01.  , , HDL 37, LDL 70.  He reports A1c elevated >6.5%.  Metformin added. Has maintained 19 pound weight loss.       Cardiac History:    Past Surgical History:   Procedure Laterality Date    CARDIAC CATHETERIZATION  12/01/2020    Mypocardial Bridging of LAD    CARDIOVASCULAR STRESS TEST  06/17/2013    Stress- ([facility]) 8 Min, 85% THR. /90. Negative.     CARDIOVASCULAR STRESS TEST  11/27/2019    R.Stress- 7 Min, 15 Secs. 10.1 METS. 77% THR. BP- 171/64. Inconclusive test.    CARDIOVASCULAR STRESS TEST  12/10/2019    L. Cardiolite- EF 56%. R/O Small Lateral Ischemia.    CONVERTED (HISTORICAL) HOLTER  01/09/2014    Holter ([facility])- AVG HR 70 BPM.     ECHO - CONVERTED  01/13/2014    Echo ([facility])- EF 65%     ECHO - CONVERTED  11/27/2019    TLS. EF 65%. Mild MR. LA- 4.1 Cm. RVSP- 13v mmHg.    OTHER SURGICAL HISTORY  01/13/2014    CTA renals ([facility])- Normal     OTHER SURGICAL HISTORY  03/20/2014    Ecardio- Sinus      Current Outpatient Medications   Medication Sig Dispense Refill    ALPRAZolam (XANAX)  1 MG tablet Take 1 tablet by mouth 2 (Two) Times a Day As Needed for Anxiety.      Arginine 1000 MG tablet Take 1,000 mg by mouth 2 (two) times a day. 60 each 8    aspirin 81 MG EC tablet Take 1 tablet by mouth Daily.      losartan (Cozaar) 50 MG tablet Take 1 tablet by mouth Daily. 90 tablet 3    metFORMIN (GLUCOPHAGE) 500 MG tablet Take 1 tablet by mouth Daily With Breakfast.      nadolol (CORGARD) 40 MG tablet Take 1 tablet by mouth 2 (Two) Times a Day. 180 tablet 4    omeprazole (priLOSEC) 40 MG capsule Take 1 capsule by mouth Daily.      PARoxetine (PAXIL) 20 MG tablet Take 1 tablet by mouth Every Morning.      pravastatin (PRAVACHOL) 20 MG tablet Take 1 tablet by mouth Daily. 90 tablet 4     No current facility-administered medications for this visit.     Patient has no known allergies.    Past Medical History:   Diagnosis Date    Anxiety     Diabetes mellitus     H/O hernia repair     Hypertension     Palpitations     SOB (shortness of breath)      Social History     Socioeconomic History    Marital status:    Tobacco Use    Smoking status: Former     Current packs/day: 0.00     Types: Cigarettes     Start date:      Quit date:      Years since quittin.4     Passive exposure: Past    Smokeless tobacco: Never   Vaping Use    Vaping status: Former    Start date: 2023    Substances: Nicotine, Flavoring    Devices: Disposable   Substance and Sexual Activity    Alcohol use: Yes     Comment: rarely drinks a beer    Drug use: No    Sexual activity: Defer     Family History   Problem Relation Age of Onset    Hyperlipidemia Mother     Asthma Brother      Review of Systems   Constitutional: Negative for decreased appetite and malaise/fatigue.   HENT: Negative.     Eyes:  Negative for blurred vision.   Cardiovascular:  Negative for chest pain, dyspnea on exertion, leg swelling, palpitations and syncope.   Respiratory:  Negative for shortness of breath and sleep disturbances due to breathing.   "  Endocrine: Negative.    Hematologic/Lymphatic: Negative for bleeding problem. Does not bruise/bleed easily.   Skin: Negative.    Musculoskeletal:  Negative for falls and myalgias.   Gastrointestinal:  Negative for abdominal pain, heartburn and melena.   Genitourinary:  Negative for hematuria.   Neurological:  Negative for dizziness and light-headedness.   Psychiatric/Behavioral:  Negative for altered mental status.    Allergic/Immunologic: Negative.       Objective     /80 (BP Location: Left arm, Patient Position: Sitting)   Pulse 62   Ht 188 cm (74.02\")   Wt 132 kg (292 lb)   BMI 37.47 kg/m²     Vitals and nursing note reviewed.   Constitutional:       General: Not in acute distress.     Appearance: Well-developed. Not diaphoretic.   Eyes:      Pupils: Pupils are equal, round, and reactive to light.   HENT:      Head: Normocephalic.   Pulmonary:      Effort: Pulmonary effort is normal. No respiratory distress.      Breath sounds: Normal breath sounds.   Cardiovascular:      Normal rate. Regular rhythm.   Pulses:     Intact distal pulses.   Edema:     Peripheral edema absent.   Abdominal:      General: Bowel sounds are normal.      Palpations: Abdomen is soft.   Musculoskeletal: Normal range of motion.      Cervical back: Normal range of motion. Skin:     General: Skin is warm and dry.   Neurological:      Mental Status: Alert and oriented to person, place, and time.          ECG 12 Lead    Date/Time: 6/17/2025 5:02 PM  Performed by: Damaris Sunmer APRN    Authorized by: Damaris Sumner APRN  Comparison: compared with previous ECG   Rhythm: sinus rhythm  Rate: normal  BPM: 62  ST Segments: ST segments normal    Clinical impression: non-specific ECG  Comments:  ms   ms  QTc 418 ms            Problem List Items Addressed This Visit          Cardiac and Vasculature    Palpitation - Primary    HTN (hypertension)    Relevant Medications    losartan (Cozaar) 50 MG tablet    nadolol (CORGARD) 40 MG " tablet    Mixed hyperlipidemia    Relevant Medications    pravastatin (PRAVACHOL) 20 MG tablet    Coronary-myocardial bridge    Relevant Medications    nadolol (CORGARD) 40 MG tablet    Endothelial dysfunction of coronary artery    Relevant Medications    nadolol (CORGARD) 40 MG tablet       Endocrine and Metabolic    Type 2 diabetes mellitus without complication, without long-term current use of insulin      Palpitations  -overall well-controlled with Corgard  -Heart rate and rhythm normal on exam  -EKG today showed NSR at 62 bpm, no ectopy     HTN  -BP now well controlled with losartan  -continue same plan     Hypercholesterolemia  -LDL 70, at goal  -Continue pravastatin  -labs will be done in August at [facility]     Myocardial bridging  -No significant anginal pain     T2DM  -A1c 6.8%  -Metformin added per Dr. Lester  -Dietary changes, 19 pound weight loss, has maintained  -continue aspirin, statin, ARB    Follow-up in 6 months, sooner as needed.            Electronically signed by CHAD Machado,  June 20, 2025 09:08 EDT